# Patient Record
Sex: MALE | Race: WHITE
[De-identification: names, ages, dates, MRNs, and addresses within clinical notes are randomized per-mention and may not be internally consistent; named-entity substitution may affect disease eponyms.]

---

## 2019-07-18 ENCOUNTER — HOSPITAL ENCOUNTER (OUTPATIENT)
Dept: HOSPITAL 88 - ER | Age: 34
Setting detail: OBSERVATION
LOS: 2 days | Discharge: HOME | End: 2019-07-20
Attending: INTERNAL MEDICINE | Admitting: INTERNAL MEDICINE
Payer: COMMERCIAL

## 2019-07-18 VITALS — WEIGHT: 203.31 LBS | HEIGHT: 70 IN | BODY MASS INDEX: 29.11 KG/M2

## 2019-07-18 VITALS — SYSTOLIC BLOOD PRESSURE: 109 MMHG | DIASTOLIC BLOOD PRESSURE: 66 MMHG

## 2019-07-18 VITALS — DIASTOLIC BLOOD PRESSURE: 79 MMHG | SYSTOLIC BLOOD PRESSURE: 123 MMHG

## 2019-07-18 VITALS — SYSTOLIC BLOOD PRESSURE: 113 MMHG | DIASTOLIC BLOOD PRESSURE: 66 MMHG

## 2019-07-18 VITALS — DIASTOLIC BLOOD PRESSURE: 55 MMHG | SYSTOLIC BLOOD PRESSURE: 116 MMHG
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
## 2019-07-18 DIAGNOSIS — S61.032A: ICD-10-CM

## 2019-07-18 DIAGNOSIS — S61.432A: Primary | ICD-10-CM

## 2019-07-18 DIAGNOSIS — S61.231A: ICD-10-CM

## 2019-07-18 DIAGNOSIS — L03.012: ICD-10-CM
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
## 2019-07-18 LAB
ANION GAP SERPL CALC-SCNC: 15 MMOL/L (ref 8–16)
BASOPHILS # BLD AUTO: 0 10*3/UL (ref 0–0.1)
BASOPHILS NFR BLD AUTO: 0.3 % (ref 0–1)
BUN SERPL-MCNC: 11 MG/DL (ref 7–26)
BUN/CREAT SERPL: 10 (ref 6–25)
CALCIUM SERPL-MCNC: 9.5 MG/DL (ref 8.4–10.2)
CHLORIDE SERPL-SCNC: 102 MMOL/L (ref 98–107)
CO2 SERPL-SCNC: 25 MMOL/L (ref 22–29)
DEPRECATED NEUTROPHILS # BLD AUTO: 7.9 10*3/UL (ref 2.1–6.9)
EGFRCR SERPLBLD CKD-EPI 2021: > 60 ML/MIN (ref 60–?)
EOSINOPHIL # BLD AUTO: 0.1 10*3/UL (ref 0–0.4)
EOSINOPHIL NFR BLD AUTO: 0.9 % (ref 0–6)
ERYTHROCYTE [DISTWIDTH] IN CORD BLOOD: 13.4 % (ref 11.7–14.4)
GLUCOSE SERPLBLD-MCNC: 84 MG/DL (ref 74–118)
HCT VFR BLD AUTO: 48.2 % (ref 38.2–49.6)
HGB BLD-MCNC: 16.3 G/DL (ref 14–18)
LYMPHOCYTES # BLD: 1.3 10*3/UL (ref 1–3.2)
LYMPHOCYTES NFR BLD AUTO: 12.3 % (ref 18–39.1)
MCH RBC QN AUTO: 30 PG (ref 28–32)
MCHC RBC AUTO-ENTMCNC: 33.8 G/DL (ref 31–35)
MCV RBC AUTO: 88.8 FL (ref 81–99)
MONOCYTES # BLD AUTO: 0.9 10*3/UL (ref 0.2–0.8)
MONOCYTES NFR BLD AUTO: 8.4 % (ref 4.4–11.3)
NEUTS SEG NFR BLD AUTO: 77.8 % (ref 38.7–80)
PLATELET # BLD AUTO: 264 X10E3/UL (ref 140–360)
POTASSIUM SERPL-SCNC: 4 MMOL/L (ref 3.5–5.1)
RBC # BLD AUTO: 5.43 X10E6/UL (ref 4.3–5.7)
SODIUM SERPL-SCNC: 138 MMOL/L (ref 136–145)

## 2019-07-18 PROCEDURE — 87040 BLOOD CULTURE FOR BACTERIA: CPT

## 2019-07-18 PROCEDURE — 73218 MRI UPPER EXTREMITY W/O DYE: CPT

## 2019-07-18 PROCEDURE — 80202 ASSAY OF VANCOMYCIN: CPT

## 2019-07-18 PROCEDURE — 90714 TD VACC NO PRESV 7 YRS+ IM: CPT

## 2019-07-18 PROCEDURE — 80048 BASIC METABOLIC PNL TOTAL CA: CPT

## 2019-07-18 PROCEDURE — 73130 X-RAY EXAM OF HAND: CPT

## 2019-07-18 PROCEDURE — 85025 COMPLETE CBC W/AUTO DIFF WBC: CPT

## 2019-07-18 PROCEDURE — 90471 IMMUNIZATION ADMIN: CPT

## 2019-07-18 PROCEDURE — 99284 EMERGENCY DEPT VISIT MOD MDM: CPT

## 2019-07-18 PROCEDURE — 36415 COLL VENOUS BLD VENIPUNCTURE: CPT

## 2019-07-18 RX ADMIN — SODIUM CHLORIDE, POTASSIUM CHLORIDE, SODIUM LACTATE AND CALCIUM CHLORIDE SCH MLS/HR: 600; 310; 30; 20 INJECTION, SOLUTION INTRAVENOUS at 12:34

## 2019-07-18 RX ADMIN — DOXYCYCLINE SCH MLS/HR: 100 INJECTION, POWDER, LYOPHILIZED, FOR SOLUTION INTRAVENOUS at 16:10

## 2019-07-18 RX ADMIN — SODIUM CHLORIDE SCH MLS/HR: 900 INJECTION, SOLUTION INTRAVENOUS at 12:34

## 2019-07-18 RX ADMIN — TAZOBACTAM SODIUM AND PIPERACILLIN SODIUM SCH MLS/HR: 375; 3 INJECTION, SOLUTION INTRAVENOUS at 18:30

## 2019-07-18 RX ADMIN — Medication PRN MG: at 21:38

## 2019-07-18 RX ADMIN — VANCOMYCIN HYDROCHLORIDE SCH MLS/HR: 1 INJECTION, SOLUTION INTRAVENOUS at 15:00

## 2019-07-18 NOTE — NUR
Rounds by attending, orders for in patient admission, tolerated all abx, left hand still 
swollen but no drainage. No major c/o pains. Report given to on coming nurse and rounds 
completed.

## 2019-07-18 NOTE — DIAGNOSTIC IMAGING REPORT
Exam:  Left Hand Series.



History: Trauma



Comparison: None



Findings:  

3 views of the left hand show no acute fracture or dislocation. The soft

tissues are unremarkable. No radiopaque foreign body.



Impression:

No acute osseous injury.



Signed by: Dallin Gomez MD on 7/18/2019 11:34 AM

## 2019-07-18 NOTE — NUR
Report received from LUIS Rosas. Pt sitting in bed comfortably in no apparent distress. Pt 
states discomfort in left hand with movement at 4/10. Pt educated to use call light if any 
assistance needed. Call light is in reach of patient. Will continue to monitor.

## 2019-07-18 NOTE — DIAGNOSTIC IMAGING REPORT
Left hand MRI without contrast.



History:  Swelling. Left hand pain between the first and second digit. Stung by

fish. Trauma. Infection.



Comparison:  Radiographs 7/18/2019



Technique: Multiplanar multisequence MRI of the hand without contrast.



Findings: 



Slightly limited exam due to patient motion artifact.



Marked soft tissue edema between the thumb and index finger extending around

and into the adjacent musculature. No well-formed drainable fluid

collection/abscess is seen at this time.



No ligamentous or tendon tear.



No acute fracture, subluxation or avascular necrosis



The visualized neurovascular bundles are intact.



Impression:

Marked soft tissue edema between the thumb and index finger extending around

and into the adjacent musculature. This is most worrisome for cellulitis and

early myositis. No well-formed drainable fluid collection/abscess is seen at

this time.







Signed by: Dr. Pee Gamez M.D. on 7/18/2019 1:54 PM

## 2019-07-18 NOTE — NUR
Received patient from ER, a/ox3, left had cellulitis and on IV abx,, no resp distress, VSS, 
no c/o pains at the moment, ambulatory, IV in place running at ordered rate, call light 
within reach and will monitor.

## 2019-07-19 VITALS — DIASTOLIC BLOOD PRESSURE: 70 MMHG | SYSTOLIC BLOOD PRESSURE: 110 MMHG

## 2019-07-19 VITALS — SYSTOLIC BLOOD PRESSURE: 111 MMHG | DIASTOLIC BLOOD PRESSURE: 67 MMHG

## 2019-07-19 VITALS — DIASTOLIC BLOOD PRESSURE: 64 MMHG | SYSTOLIC BLOOD PRESSURE: 110 MMHG

## 2019-07-19 VITALS — DIASTOLIC BLOOD PRESSURE: 78 MMHG | SYSTOLIC BLOOD PRESSURE: 127 MMHG

## 2019-07-19 VITALS — SYSTOLIC BLOOD PRESSURE: 105 MMHG | DIASTOLIC BLOOD PRESSURE: 66 MMHG

## 2019-07-19 VITALS — DIASTOLIC BLOOD PRESSURE: 66 MMHG | SYSTOLIC BLOOD PRESSURE: 105 MMHG

## 2019-07-19 VITALS — SYSTOLIC BLOOD PRESSURE: 136 MMHG | DIASTOLIC BLOOD PRESSURE: 89 MMHG

## 2019-07-19 VITALS — DIASTOLIC BLOOD PRESSURE: 89 MMHG | SYSTOLIC BLOOD PRESSURE: 136 MMHG

## 2019-07-19 VITALS — SYSTOLIC BLOOD PRESSURE: 114 MMHG | DIASTOLIC BLOOD PRESSURE: 73 MMHG

## 2019-07-19 LAB
ANION GAP SERPL CALC-SCNC: 12.3 MMOL/L (ref 8–16)
BASOPHILS # BLD AUTO: 0 10*3/UL (ref 0–0.1)
BASOPHILS NFR BLD AUTO: 0.5 % (ref 0–1)
BUN SERPL-MCNC: 10 MG/DL (ref 7–26)
BUN/CREAT SERPL: 8 (ref 6–25)
CALCIUM SERPL-MCNC: 8.9 MG/DL (ref 8.4–10.2)
CHLORIDE SERPL-SCNC: 105 MMOL/L (ref 98–107)
CO2 SERPL-SCNC: 26 MMOL/L (ref 22–29)
DEPRECATED NEUTROPHILS # BLD AUTO: 5.7 10*3/UL (ref 2.1–6.9)
EGFRCR SERPLBLD CKD-EPI 2021: > 60 ML/MIN (ref 60–?)
EOSINOPHIL # BLD AUTO: 0.2 10*3/UL (ref 0–0.4)
EOSINOPHIL NFR BLD AUTO: 2.3 % (ref 0–6)
ERYTHROCYTE [DISTWIDTH] IN CORD BLOOD: 13.4 % (ref 11.7–14.4)
GLUCOSE SERPLBLD-MCNC: 100 MG/DL (ref 74–118)
HCT VFR BLD AUTO: 45.7 % (ref 38.2–49.6)
HGB BLD-MCNC: 15.1 G/DL (ref 14–18)
LYMPHOCYTES # BLD: 1.8 10*3/UL (ref 1–3.2)
LYMPHOCYTES NFR BLD AUTO: 20.7 % (ref 18–39.1)
MCH RBC QN AUTO: 29.4 PG (ref 28–32)
MCHC RBC AUTO-ENTMCNC: 33 G/DL (ref 31–35)
MCV RBC AUTO: 88.9 FL (ref 81–99)
MONOCYTES # BLD AUTO: 1 10*3/UL (ref 0.2–0.8)
MONOCYTES NFR BLD AUTO: 11.3 % (ref 4.4–11.3)
NEUTS SEG NFR BLD AUTO: 64.7 % (ref 38.7–80)
PLATELET # BLD AUTO: 234 X10E3/UL (ref 140–360)
POTASSIUM SERPL-SCNC: 4.3 MMOL/L (ref 3.5–5.1)
RBC # BLD AUTO: 5.14 X10E6/UL (ref 4.3–5.7)
SODIUM SERPL-SCNC: 139 MMOL/L (ref 136–145)

## 2019-07-19 RX ADMIN — FAMOTIDINE SCH MG: 20 TABLET, FILM COATED ORAL at 08:20

## 2019-07-19 RX ADMIN — VANCOMYCIN HYDROCHLORIDE SCH MLS/HR: 1 INJECTION, SOLUTION INTRAVENOUS at 17:00

## 2019-07-19 RX ADMIN — TAZOBACTAM SODIUM AND PIPERACILLIN SODIUM SCH MLS/HR: 375; 3 INJECTION, SOLUTION INTRAVENOUS at 00:03

## 2019-07-19 RX ADMIN — TAZOBACTAM SODIUM AND PIPERACILLIN SODIUM SCH MLS/HR: 375; 3 INJECTION, SOLUTION INTRAVENOUS at 05:51

## 2019-07-19 RX ADMIN — TAZOBACTAM SODIUM AND PIPERACILLIN SODIUM SCH MLS/HR: 375; 3 INJECTION, SOLUTION INTRAVENOUS at 11:23

## 2019-07-19 RX ADMIN — DOXYCYCLINE SCH MLS/HR: 100 INJECTION, POWDER, LYOPHILIZED, FOR SOLUTION INTRAVENOUS at 14:07

## 2019-07-19 RX ADMIN — VANCOMYCIN HYDROCHLORIDE SCH MLS/HR: 1 INJECTION, SOLUTION INTRAVENOUS at 03:57

## 2019-07-19 RX ADMIN — SODIUM CHLORIDE SCH MLS/HR: 900 INJECTION, SOLUTION INTRAVENOUS at 12:08

## 2019-07-19 RX ADMIN — Medication PRN MG: at 06:11

## 2019-07-19 RX ADMIN — Medication PRN MG: at 12:20

## 2019-07-19 RX ADMIN — DOXYCYCLINE SCH MLS/HR: 100 INJECTION, POWDER, LYOPHILIZED, FOR SOLUTION INTRAVENOUS at 02:30

## 2019-07-19 RX ADMIN — TAZOBACTAM SODIUM AND PIPERACILLIN SODIUM SCH MLS/HR: 375; 3 INJECTION, SOLUTION INTRAVENOUS at 16:30

## 2019-07-19 RX ADMIN — SODIUM CHLORIDE, POTASSIUM CHLORIDE, SODIUM LACTATE AND CALCIUM CHLORIDE SCH MLS/HR: 600; 310; 30; 20 INJECTION, SOLUTION INTRAVENOUS at 05:51

## 2019-07-19 NOTE — CONSULTATION
DATE OF CONSULTATION:  07/18/2019  

 

ER is requesting the consultation.

 

HISTORY OF PRESENT ILLNESS:  The patient is a 33-year-old right-hand-dominant male, who

states that on July 17th, he was fishing and he sustained a puncture wound from a

catfish in the left hand.  The patient states that he cleansed the wound, but that

overnight the hand became swollen and painful and he is now in the emergency room for

evaluation and consultation. 

 

PAST MEDICAL HISTORY:  Negative.

 

PAST SURGICAL HISTORY:  Negative to the current problem.

 

PHYSICAL EXAMINATION:

VITAL SIGNS:  He is afebrile and the vital signs are stable. 

SKIN:  The pertinent exam shows a small wound located in the left 1st web space.  There

is a 1-2 mm ecchymotic area.  The entire 1st web space is tensely swollen.  There is no

erythema.  There is no purulent drainage from the area of ecchymosis and the area is

tender to the touch.  No evidence of flexor tenosynovitis on exam. 

IMPRESSION:  Left hand marine puncture wound, possible abscess.

 

PLAN:  I have explained to the patient that right now he has evidence of infectious

process going on in the left 1st web space.  There is concern that there may be early

abscess formation.  For this reason, an MRI will be obtained.  We will keep the patient

n.p.o. should he need to be taken to the OR for incision and drainage procedure. 

 

Thank you for allowing me to participate in the care your patient.

 

 

 

 

______________________________

MD WESLY Diaz/MODL

D:  07/19/2019 08:49:08

T:  07/19/2019 13:19:12

Job #:  [id number]

## 2019-07-19 NOTE — NUR
Patient received lying in bed. AAO x 4. Family at bedside.  Patient had no complaints of 
pain. Respirations even and non-labored. Safety measures implemented. Patient instructed to 
call for assistance when needed. Call light within reach.

## 2019-07-20 VITALS — SYSTOLIC BLOOD PRESSURE: 127 MMHG | DIASTOLIC BLOOD PRESSURE: 76 MMHG

## 2019-07-20 VITALS — DIASTOLIC BLOOD PRESSURE: 86 MMHG | SYSTOLIC BLOOD PRESSURE: 138 MMHG

## 2019-07-20 VITALS — SYSTOLIC BLOOD PRESSURE: 120 MMHG | DIASTOLIC BLOOD PRESSURE: 69 MMHG

## 2019-07-20 VITALS — SYSTOLIC BLOOD PRESSURE: 122 MMHG | DIASTOLIC BLOOD PRESSURE: 81 MMHG

## 2019-07-20 LAB
ANION GAP SERPL CALC-SCNC: 11.9 MMOL/L (ref 8–16)
BASOPHILS # BLD AUTO: 0 10*3/UL (ref 0–0.1)
BASOPHILS NFR BLD AUTO: 0.4 % (ref 0–1)
BUN SERPL-MCNC: 9 MG/DL (ref 7–26)
BUN/CREAT SERPL: 8 (ref 6–25)
CALCIUM SERPL-MCNC: 8.7 MG/DL (ref 8.4–10.2)
CHLORIDE SERPL-SCNC: 108 MMOL/L (ref 98–107)
CO2 SERPL-SCNC: 24 MMOL/L (ref 22–29)
DEPRECATED NEUTROPHILS # BLD AUTO: 5.6 10*3/UL (ref 2.1–6.9)
EGFRCR SERPLBLD CKD-EPI 2021: > 60 ML/MIN (ref 60–?)
EOSINOPHIL # BLD AUTO: 0.2 10*3/UL (ref 0–0.4)
EOSINOPHIL NFR BLD AUTO: 2.2 % (ref 0–6)
ERYTHROCYTE [DISTWIDTH] IN CORD BLOOD: 13.5 % (ref 11.7–14.4)
GLUCOSE SERPLBLD-MCNC: 93 MG/DL (ref 74–118)
HCT VFR BLD AUTO: 42.3 % (ref 38.2–49.6)
HGB BLD-MCNC: 14.3 G/DL (ref 14–18)
LYMPHOCYTES # BLD: 1.6 10*3/UL (ref 1–3.2)
LYMPHOCYTES NFR BLD AUTO: 19 % (ref 18–39.1)
MCH RBC QN AUTO: 29.8 PG (ref 28–32)
MCHC RBC AUTO-ENTMCNC: 33.8 G/DL (ref 31–35)
MCV RBC AUTO: 88.1 FL (ref 81–99)
MONOCYTES # BLD AUTO: 0.9 10*3/UL (ref 0.2–0.8)
MONOCYTES NFR BLD AUTO: 10.4 % (ref 4.4–11.3)
NEUTS SEG NFR BLD AUTO: 67.6 % (ref 38.7–80)
PLATELET # BLD AUTO: 225 X10E3/UL (ref 140–360)
POTASSIUM SERPL-SCNC: 3.9 MMOL/L (ref 3.5–5.1)
RBC # BLD AUTO: 4.8 X10E6/UL (ref 4.3–5.7)
SODIUM SERPL-SCNC: 140 MMOL/L (ref 136–145)

## 2019-07-20 RX ADMIN — DOXYCYCLINE SCH MLS/HR: 100 INJECTION, POWDER, LYOPHILIZED, FOR SOLUTION INTRAVENOUS at 02:30

## 2019-07-20 RX ADMIN — SODIUM CHLORIDE SCH MLS/HR: 900 INJECTION, SOLUTION INTRAVENOUS at 12:44

## 2019-07-20 RX ADMIN — TAZOBACTAM SODIUM AND PIPERACILLIN SODIUM SCH MLS/HR: 375; 3 INJECTION, SOLUTION INTRAVENOUS at 12:04

## 2019-07-20 RX ADMIN — TAZOBACTAM SODIUM AND PIPERACILLIN SODIUM SCH MLS/HR: 375; 3 INJECTION, SOLUTION INTRAVENOUS at 00:02

## 2019-07-20 RX ADMIN — TAZOBACTAM SODIUM AND PIPERACILLIN SODIUM SCH MLS/HR: 375; 3 INJECTION, SOLUTION INTRAVENOUS at 06:00

## 2019-07-20 RX ADMIN — VANCOMYCIN HYDROCHLORIDE SCH MLS/HR: 1 INJECTION, SOLUTION INTRAVENOUS at 04:00

## 2019-07-20 RX ADMIN — DOXYCYCLINE SCH MLS/HR: 100 INJECTION, POWDER, LYOPHILIZED, FOR SOLUTION INTRAVENOUS at 14:37

## 2019-07-20 RX ADMIN — FAMOTIDINE SCH MG: 20 TABLET, FILM COATED ORAL at 08:39

## 2019-07-20 NOTE — NUR
pt awake walking around, resp even and unlabored at this time no distress noted pt easily 
aroused and able to make needs known , no c/o pain to hand,  when asked, family at bedside, 
call light in reach.

## 2019-07-20 NOTE — DISCHARGE SUMMARY
BRIEF HISTORY:  Mr. Fonseca is a 33-year-old male, who sustained an injury by catfish

fin in [facility] on 07/17/2019.  He initially put chewing tobacco on it, then

whiskey, then 20% alcohol.  There was increased swelling and erythema on the morning of

the 18th, thus he went to the emergency department. 

 

PAST MEDICAL HISTORY:  He has no past medical history.

 

PAST SURGICAL HISTORY:  Includes hernia and vasectomy.

 

FAMILY HISTORY:  Noncontributory.

 

SOCIAL HISTORY:  No tobacco.  Occasional alcohol.  No illicit drugs.

 

ALLERGIES:  NO KNOWN ALLERGIES.

 

ADMITTING DIAGNOSES:  Include cellulitis of the left hand, stung by catfish.

 

DISCHARGE DIAGNOSES:  Include cellulitis of the left hand, stung by catfish.

 

HOSPITAL COURSE:  He was initially treated with IV Zosyn, doxycycline and azithromycin.

Hand Surgery was consulted.  The patient was seen by Dr. Fernandez on July 19th.  Per

his documentation, he explained to the patient that he had evidence of an infectious

process going on in the left 1st web space.  There was concern that there may be early

abscess formation and an MRI was obtained of the left hand, which was negative.  The

patient was kept n.p.o. initially in case he needed to be taken to the OR for I and D

procedure, however, the pain improved, the swelling started to go down, and the range of

motion increased, white blood cell count improved.  Warm soaks were advised and Hand

Surgery signed off.  There was no need for I and D per Hand Surgery.  Today, the patient

notes that the swelling and range of motion continue to improve.  His vancomycin trough

level today was 5.5.  WBC 8.27, it was 8.73 yesterday.  Electrolytes are within normal

limits.  His pain is controlled.  He had received a tetanus shot.  Case was discussed

with Dr. Tatum.  We will send the patient home on oral Cipro and Bactrim DS. 

 

DIET:  Continue regular diet.

 

ACTIVITY LEVEL:  As tolerated.

 

FOLLOWUP:  Follow up with PCP in 1-2 weeks.

 

 

Dictated by John Santos NP

 

______________________________

MD NELIA MooneyP/MODL

D:  07/20/2019 15:35:42

T:  07/20/2019 21:56:21

Job #:  [id number]

## 2019-07-20 NOTE — NUR
pt discharge home with prescriptions pt was educated on his medication, iv site removed at 
this time no swelling no redness to site.